# Patient Record
Sex: FEMALE | Race: WHITE | NOT HISPANIC OR LATINO | Employment: OTHER | URBAN - METROPOLITAN AREA
[De-identification: names, ages, dates, MRNs, and addresses within clinical notes are randomized per-mention and may not be internally consistent; named-entity substitution may affect disease eponyms.]

---

## 2020-12-05 ENCOUNTER — TRANSFERRED RECORDS (OUTPATIENT)
Dept: HEALTH INFORMATION MANAGEMENT | Facility: CLINIC | Age: 75
End: 2020-12-05

## 2020-12-05 LAB
ALT SERPL-CCNC: 14 U/L (ref 14–54)
AST SERPL-CCNC: 20 U/L (ref 15–41)
CREATININE (EXTERNAL): 1.61 MG/DL (ref 0.44–1)
GFR ESTIMATED (EXTERNAL): 33.1 ML/MIN/1.73M^2
GFR ESTIMATED (IF AFRICAN AMERICAN) (EXTERNAL): 40.05 ML/MIN/1.73M^2
GLUCOSE (EXTERNAL): 245 MG/DL (ref 65–100)
INR (EXTERNAL): 0.9 (ref 0.9–1.1)
POTASSIUM (EXTERNAL): 5.1 MMOL/L (ref 3.5–5.1)

## 2020-12-06 LAB
CREATININE (EXTERNAL): 1.13 MG/DL (ref 0.44–1)
GFR ESTIMATED (EXTERNAL): 49.8 ML/MIN/1.73M^2
GFR ESTIMATED (IF AFRICAN AMERICAN) (EXTERNAL): >60 ML/MIN/1.73M^2
GLUCOSE (EXTERNAL): 101 MG/DL (ref 65–100)
POTASSIUM (EXTERNAL): 4.4 MMOL/L (ref 3.5–5.1)
TSH SERPL-ACNC: 1.03 MLU/L (ref 0.45–5.33)

## 2020-12-07 LAB
CREATININE (EXTERNAL): 0.84 MG/DL (ref 0.44–1)
GFR ESTIMATED (EXTERNAL): >60 ML/MIN/1.73M^2
GFR ESTIMATED (IF AFRICAN AMERICAN) (EXTERNAL): >60 ML/MIN/1.73M^2
GLUCOSE (EXTERNAL): 106 MG/DL (ref 65–100)
POTASSIUM (EXTERNAL): 4.8 MMOL/L (ref 3.5–5.1)

## 2020-12-08 LAB
CREATININE (EXTERNAL): 0.69 MG/DL (ref 0.44–1)
GFR ESTIMATED (EXTERNAL): >60 ML/MIN/1.73M^2
GFR ESTIMATED (IF AFRICAN AMERICAN) (EXTERNAL): >60 ML/MIN/1.73M^2
GLUCOSE (EXTERNAL): 93 MG/DL (ref 65–100)
POTASSIUM (EXTERNAL): 4.4 MMOL/L (ref 3.5–5.1)

## 2020-12-09 LAB
CREATININE (EXTERNAL): 0.53 MG/DL (ref 0.44–1)
GFR ESTIMATED (EXTERNAL): >60 ML/MIN/1.73M^2
GFR ESTIMATED (IF AFRICAN AMERICAN) (EXTERNAL): >60 ML/MIN/1.73M^2
GLUCOSE (EXTERNAL): 95 MG/DL (ref 65–100)
POTASSIUM (EXTERNAL): 4.1 MMOL/L (ref 3.5–5.1)

## 2021-10-31 ENCOUNTER — ANESTHESIA EVENT (OUTPATIENT)
Dept: GASTROENTEROLOGY | Facility: CLINIC | Age: 76
End: 2021-10-31
Payer: COMMERCIAL

## 2021-10-31 ENCOUNTER — ANESTHESIA (OUTPATIENT)
Dept: GASTROENTEROLOGY | Facility: CLINIC | Age: 76
End: 2021-10-31
Payer: COMMERCIAL

## 2021-10-31 ENCOUNTER — HOSPITAL ENCOUNTER (OUTPATIENT)
Facility: CLINIC | Age: 76
Setting detail: OBSERVATION
Discharge: HOME | End: 2021-11-01
Attending: EMERGENCY MEDICINE | Admitting: SURGERY
Payer: COMMERCIAL

## 2021-10-31 DIAGNOSIS — K92.2 ACUTE UPPER GI BLEEDING: ICD-10-CM

## 2021-10-31 DIAGNOSIS — K92.2 UPPER GI BLEED: Primary | ICD-10-CM

## 2021-10-31 PROBLEM — I10 BENIGN ESSENTIAL HYPERTENSION: Status: ACTIVE | Noted: 2021-10-31

## 2021-10-31 PROBLEM — D62 ANEMIA DUE TO BLOOD LOSS, ACUTE: Status: ACTIVE | Noted: 2021-10-31

## 2021-10-31 PROBLEM — F10.10 EXCESSIVE DRINKING OF ALCOHOL: Status: ACTIVE | Noted: 2021-10-31

## 2021-10-31 PROBLEM — E03.9 ACQUIRED HYPOTHYROIDISM: Status: ACTIVE | Noted: 2021-10-31

## 2021-10-31 PROBLEM — K26.4 GASTROINTESTINAL HEMORRHAGE ASSOCIATED WITH DUODENAL ULCER: Status: ACTIVE | Noted: 2021-10-31

## 2021-10-31 LAB
ABO/RH(D): NORMAL
ALBUMIN SERPL-MCNC: 3 G/DL (ref 3.4–5)
ALBUMIN UR-MCNC: NEGATIVE MG/DL
ALP SERPL-CCNC: 93 U/L (ref 40–150)
ALT SERPL W P-5'-P-CCNC: 16 U/L (ref 0–50)
ANION GAP SERPL CALCULATED.3IONS-SCNC: 10 MMOL/L (ref 3–14)
ANTIBODY SCREEN: NEGATIVE
APPEARANCE UR: ABNORMAL
APTT PPP: 30 SECONDS (ref 22–38)
AST SERPL W P-5'-P-CCNC: 13 U/L (ref 0–45)
BACTERIA #/AREA URNS HPF: ABNORMAL /HPF
BASOPHILS # BLD AUTO: 0 10E3/UL (ref 0–0.2)
BASOPHILS # BLD AUTO: 0.1 10E3/UL (ref 0–0.2)
BASOPHILS NFR BLD AUTO: 1 %
BASOPHILS NFR BLD AUTO: 1 %
BILIRUB SERPL-MCNC: 0.4 MG/DL (ref 0.2–1.3)
BILIRUB UR QL STRIP: NEGATIVE
BUN SERPL-MCNC: 48 MG/DL (ref 7–30)
CALCIUM SERPL-MCNC: 9 MG/DL (ref 8.5–10.1)
CHLORIDE BLD-SCNC: 104 MMOL/L (ref 94–109)
CO2 SERPL-SCNC: 23 MMOL/L (ref 20–32)
COLOR UR AUTO: YELLOW
CREAT SERPL-MCNC: 1.25 MG/DL (ref 0.52–1.04)
EOSINOPHIL # BLD AUTO: 0 10E3/UL (ref 0–0.7)
EOSINOPHIL # BLD AUTO: 0.1 10E3/UL (ref 0–0.7)
EOSINOPHIL NFR BLD AUTO: 1 %
EOSINOPHIL NFR BLD AUTO: 1 %
ERYTHROCYTE [DISTWIDTH] IN BLOOD BY AUTOMATED COUNT: 12.1 % (ref 10–15)
ERYTHROCYTE [DISTWIDTH] IN BLOOD BY AUTOMATED COUNT: 12.2 % (ref 10–15)
FERRITIN SERPL-MCNC: 278 NG/ML (ref 8–252)
GFR SERPL CREATININE-BSD FRML MDRD: 42 ML/MIN/1.73M2
GLUCOSE BLD-MCNC: 119 MG/DL (ref 70–99)
GLUCOSE UR STRIP-MCNC: NEGATIVE MG/DL
HCT VFR BLD AUTO: 28.6 % (ref 35–47)
HCT VFR BLD AUTO: 28.7 % (ref 35–47)
HEMOCCULT STL QL: POSITIVE
HGB BLD-MCNC: 9.6 G/DL (ref 11.7–15.7)
HGB BLD-MCNC: 9.7 G/DL (ref 11.7–15.7)
HGB UR QL STRIP: NEGATIVE
HOLD SPECIMEN: NORMAL
HYALINE CASTS: 4 /LPF
IMM GRANULOCYTES # BLD: 0 10E3/UL
IMM GRANULOCYTES # BLD: 0 10E3/UL
IMM GRANULOCYTES NFR BLD: 1 %
IMM GRANULOCYTES NFR BLD: 1 %
INR PPP: 0.97 (ref 0.85–1.15)
IRON SATN MFR SERPL: 33 % (ref 15–46)
IRON SERPL-MCNC: 94 UG/DL (ref 35–180)
KETONES UR STRIP-MCNC: 5 MG/DL
LEUKOCYTE ESTERASE UR QL STRIP: ABNORMAL
LIPASE SERPL-CCNC: 59 U/L (ref 73–393)
LYMPHOCYTES # BLD AUTO: 2 10E3/UL (ref 0.8–5.3)
LYMPHOCYTES # BLD AUTO: 2 10E3/UL (ref 0.8–5.3)
LYMPHOCYTES NFR BLD AUTO: 34 %
LYMPHOCYTES NFR BLD AUTO: 34 %
MCH RBC QN AUTO: 35 PG (ref 26.5–33)
MCH RBC QN AUTO: 35.3 PG (ref 26.5–33)
MCHC RBC AUTO-ENTMCNC: 33.6 G/DL (ref 31.5–36.5)
MCHC RBC AUTO-ENTMCNC: 33.8 G/DL (ref 31.5–36.5)
MCV RBC AUTO: 104 FL (ref 78–100)
MCV RBC AUTO: 105 FL (ref 78–100)
MONOCYTES # BLD AUTO: 0.6 10E3/UL (ref 0–1.3)
MONOCYTES # BLD AUTO: 0.6 10E3/UL (ref 0–1.3)
MONOCYTES NFR BLD AUTO: 10 %
MONOCYTES NFR BLD AUTO: 11 %
MUCOUS THREADS #/AREA URNS LPF: PRESENT /LPF
NEUTROPHILS # BLD AUTO: 3.1 10E3/UL (ref 1.6–8.3)
NEUTROPHILS # BLD AUTO: 3.2 10E3/UL (ref 1.6–8.3)
NEUTROPHILS NFR BLD AUTO: 52 %
NEUTROPHILS NFR BLD AUTO: 53 %
NITRATE UR QL: NEGATIVE
NRBC # BLD AUTO: 0 10E3/UL
NRBC # BLD AUTO: 0 10E3/UL
NRBC BLD AUTO-RTO: 0 /100
NRBC BLD AUTO-RTO: 0 /100
PH UR STRIP: 5 [PH] (ref 5–7)
PLATELET # BLD AUTO: 264 10E3/UL (ref 150–450)
PLATELET # BLD AUTO: 291 10E3/UL (ref 150–450)
POTASSIUM BLD-SCNC: 4.6 MMOL/L (ref 3.4–5.3)
PROT SERPL-MCNC: 6 G/DL (ref 6.8–8.8)
RBC # BLD AUTO: 2.72 10E6/UL (ref 3.8–5.2)
RBC # BLD AUTO: 2.77 10E6/UL (ref 3.8–5.2)
RBC URINE: 2 /HPF
SARS-COV-2 RNA RESP QL NAA+PROBE: NEGATIVE
SODIUM SERPL-SCNC: 137 MMOL/L (ref 133–144)
SP GR UR STRIP: 1.02 (ref 1–1.03)
SPECIMEN EXPIRATION DATE: NORMAL
SQUAMOUS EPITHELIAL: 8 /HPF
TIBC SERPL-MCNC: 282 UG/DL (ref 240–430)
UPPER GI ENDOSCOPY: NORMAL
UROBILINOGEN UR STRIP-MCNC: 4 MG/DL
WBC # BLD AUTO: 5.9 10E3/UL (ref 4–11)
WBC # BLD AUTO: 5.9 10E3/UL (ref 4–11)
WBC URINE: 18 /HPF

## 2021-10-31 PROCEDURE — 96361 HYDRATE IV INFUSION ADD-ON: CPT | Mod: XU

## 2021-10-31 PROCEDURE — C9803 HOPD COVID-19 SPEC COLLECT: HCPCS

## 2021-10-31 PROCEDURE — 82272 OCCULT BLD FECES 1-3 TESTS: CPT | Performed by: EMERGENCY MEDICINE

## 2021-10-31 PROCEDURE — 250N000011 HC RX IP 250 OP 636: Performed by: SURGERY

## 2021-10-31 PROCEDURE — 87086 URINE CULTURE/COLONY COUNT: CPT | Performed by: EMERGENCY MEDICINE

## 2021-10-31 PROCEDURE — C9113 INJ PANTOPRAZOLE SODIUM, VIA: HCPCS | Performed by: EMERGENCY MEDICINE

## 2021-10-31 PROCEDURE — 83690 ASSAY OF LIPASE: CPT | Performed by: EMERGENCY MEDICINE

## 2021-10-31 PROCEDURE — 85610 PROTHROMBIN TIME: CPT | Performed by: EMERGENCY MEDICINE

## 2021-10-31 PROCEDURE — 258N000003 HC RX IP 258 OP 636: Performed by: FAMILY MEDICINE

## 2021-10-31 PROCEDURE — 96361 HYDRATE IV INFUSION ADD-ON: CPT

## 2021-10-31 PROCEDURE — 99285 EMERGENCY DEPT VISIT HI MDM: CPT | Mod: 25

## 2021-10-31 PROCEDURE — G0378 HOSPITAL OBSERVATION PER HR: HCPCS

## 2021-10-31 PROCEDURE — 86901 BLOOD TYPING SEROLOGIC RH(D): CPT | Performed by: EMERGENCY MEDICINE

## 2021-10-31 PROCEDURE — 250N000009 HC RX 250: Performed by: SURGERY

## 2021-10-31 PROCEDURE — 81001 URINALYSIS AUTO W/SCOPE: CPT | Performed by: EMERGENCY MEDICINE

## 2021-10-31 PROCEDURE — 43239 EGD BIOPSY SINGLE/MULTIPLE: CPT | Performed by: SURGERY

## 2021-10-31 PROCEDURE — 88305 TISSUE EXAM BY PATHOLOGIST: CPT | Mod: TC | Performed by: SURGERY

## 2021-10-31 PROCEDURE — 99285 EMERGENCY DEPT VISIT HI MDM: CPT | Performed by: EMERGENCY MEDICINE

## 2021-10-31 PROCEDURE — 96375 TX/PRO/DX INJ NEW DRUG ADDON: CPT

## 2021-10-31 PROCEDURE — 258N000003 HC RX IP 258 OP 636: Performed by: EMERGENCY MEDICINE

## 2021-10-31 PROCEDURE — 99220 PR INITIAL OBSERVATION CARE,LEVEL III: CPT | Performed by: FAMILY MEDICINE

## 2021-10-31 PROCEDURE — 36415 COLL VENOUS BLD VENIPUNCTURE: CPT | Performed by: EMERGENCY MEDICINE

## 2021-10-31 PROCEDURE — 250N000013 HC RX MED GY IP 250 OP 250 PS 637: Performed by: FAMILY MEDICINE

## 2021-10-31 PROCEDURE — 250N000009 HC RX 250: Performed by: NURSE ANESTHETIST, CERTIFIED REGISTERED

## 2021-10-31 PROCEDURE — 250N000011 HC RX IP 250 OP 636: Performed by: EMERGENCY MEDICINE

## 2021-10-31 PROCEDURE — 96374 THER/PROPH/DIAG INJ IV PUSH: CPT

## 2021-10-31 PROCEDURE — 43255 EGD CONTROL BLEEDING ANY: CPT | Performed by: SURGERY

## 2021-10-31 PROCEDURE — 250N000011 HC RX IP 250 OP 636: Performed by: NURSE ANESTHETIST, CERTIFIED REGISTERED

## 2021-10-31 PROCEDURE — 82728 ASSAY OF FERRITIN: CPT | Performed by: FAMILY MEDICINE

## 2021-10-31 PROCEDURE — 80053 COMPREHEN METABOLIC PANEL: CPT | Performed by: EMERGENCY MEDICINE

## 2021-10-31 PROCEDURE — 85730 THROMBOPLASTIN TIME PARTIAL: CPT | Performed by: EMERGENCY MEDICINE

## 2021-10-31 PROCEDURE — 87635 SARS-COV-2 COVID-19 AMP PRB: CPT | Performed by: EMERGENCY MEDICINE

## 2021-10-31 PROCEDURE — 83550 IRON BINDING TEST: CPT | Performed by: FAMILY MEDICINE

## 2021-10-31 PROCEDURE — 85025 COMPLETE CBC W/AUTO DIFF WBC: CPT | Performed by: EMERGENCY MEDICINE

## 2021-10-31 PROCEDURE — 370N000017 HC ANESTHESIA TECHNICAL FEE, PER MIN: Performed by: SURGERY

## 2021-10-31 PROCEDURE — 250N000013 HC RX MED GY IP 250 OP 250 PS 637: Performed by: SURGERY

## 2021-10-31 PROCEDURE — 99204 OFFICE O/P NEW MOD 45 MIN: CPT | Mod: 25 | Performed by: SURGERY

## 2021-10-31 RX ORDER — ATORVASTATIN CALCIUM 10 MG/1
10 TABLET, FILM COATED ORAL EVERY OTHER DAY
Status: DISCONTINUED | OUTPATIENT
Start: 2021-10-31 | End: 2021-10-31

## 2021-10-31 RX ORDER — ONDANSETRON 2 MG/ML
4 INJECTION INTRAMUSCULAR; INTRAVENOUS EVERY 30 MIN PRN
Status: DISCONTINUED | OUTPATIENT
Start: 2021-10-31 | End: 2021-10-31

## 2021-10-31 RX ORDER — NALOXONE HYDROCHLORIDE 0.4 MG/ML
0.2 INJECTION, SOLUTION INTRAMUSCULAR; INTRAVENOUS; SUBCUTANEOUS
Status: DISCONTINUED | OUTPATIENT
Start: 2021-10-31 | End: 2021-10-31

## 2021-10-31 RX ORDER — ONDANSETRON 4 MG/1
4 TABLET, ORALLY DISINTEGRATING ORAL EVERY 6 HOURS PRN
Status: DISCONTINUED | OUTPATIENT
Start: 2021-10-31 | End: 2021-11-01 | Stop reason: HOSPADM

## 2021-10-31 RX ORDER — EPINEPHRINE IN SOD CHLOR,ISO 1 MG/10 ML
SYRINGE (ML) INTRAVENOUS PRN
Status: DISCONTINUED | OUTPATIENT
Start: 2021-10-31 | End: 2021-10-31

## 2021-10-31 RX ORDER — SODIUM CHLORIDE, SODIUM LACTATE, POTASSIUM CHLORIDE, CALCIUM CHLORIDE 600; 310; 30; 20 MG/100ML; MG/100ML; MG/100ML; MG/100ML
INJECTION, SOLUTION INTRAVENOUS CONTINUOUS
Status: DISCONTINUED | OUTPATIENT
Start: 2021-10-31 | End: 2021-10-31

## 2021-10-31 RX ORDER — ACETAMINOPHEN 325 MG/1
650 TABLET ORAL EVERY 4 HOURS PRN
Status: DISCONTINUED | OUTPATIENT
Start: 2021-10-31 | End: 2021-10-31

## 2021-10-31 RX ORDER — SODIUM CHLORIDE 9 MG/ML
INJECTION, SOLUTION INTRAVENOUS CONTINUOUS
Status: DISCONTINUED | OUTPATIENT
Start: 2021-10-31 | End: 2021-11-01

## 2021-10-31 RX ORDER — ATENOLOL 25 MG/1
25 TABLET ORAL DAILY
COMMUNITY
Start: 2021-09-03

## 2021-10-31 RX ORDER — SODIUM CHLORIDE 9 MG/ML
1000 INJECTION, SOLUTION INTRAVENOUS CONTINUOUS
Status: DISCONTINUED | OUTPATIENT
Start: 2021-10-31 | End: 2021-10-31

## 2021-10-31 RX ORDER — LISINOPRIL 40 MG/1
40 TABLET ORAL AT BEDTIME
Status: DISCONTINUED | OUTPATIENT
Start: 2021-10-31 | End: 2021-11-01 | Stop reason: HOSPADM

## 2021-10-31 RX ORDER — ONDANSETRON 2 MG/ML
4 INJECTION INTRAMUSCULAR; INTRAVENOUS EVERY 6 HOURS PRN
Status: DISCONTINUED | OUTPATIENT
Start: 2021-10-31 | End: 2021-10-31

## 2021-10-31 RX ORDER — SUCRALFATE 1 G/1
1 TABLET ORAL
Status: DISCONTINUED | OUTPATIENT
Start: 2021-10-31 | End: 2021-10-31

## 2021-10-31 RX ORDER — PROPOFOL 10 MG/ML
INJECTION, EMULSION INTRAVENOUS PRN
Status: DISCONTINUED | OUTPATIENT
Start: 2021-10-31 | End: 2021-10-31

## 2021-10-31 RX ORDER — HYDROMORPHONE HYDROCHLORIDE 1 MG/ML
0.3 INJECTION, SOLUTION INTRAMUSCULAR; INTRAVENOUS; SUBCUTANEOUS EVERY 4 HOURS PRN
Status: DISCONTINUED | OUTPATIENT
Start: 2021-10-31 | End: 2021-10-31

## 2021-10-31 RX ORDER — LIDOCAINE 40 MG/G
CREAM TOPICAL
Status: DISCONTINUED | OUTPATIENT
Start: 2021-10-31 | End: 2021-10-31

## 2021-10-31 RX ORDER — LEVOTHYROXINE SODIUM 112 UG/1
112 TABLET ORAL EVERY MORNING
Status: DISCONTINUED | OUTPATIENT
Start: 2021-11-01 | End: 2021-11-01 | Stop reason: HOSPADM

## 2021-10-31 RX ORDER — AMLODIPINE BESYLATE 10 MG/1
10 TABLET ORAL AT BEDTIME
Status: DISCONTINUED | OUTPATIENT
Start: 2021-10-31 | End: 2021-11-01 | Stop reason: HOSPADM

## 2021-10-31 RX ORDER — ATORVASTATIN CALCIUM 10 MG/1
10 TABLET, FILM COATED ORAL EVERY OTHER DAY
Status: DISCONTINUED | OUTPATIENT
Start: 2021-11-01 | End: 2021-11-01 | Stop reason: HOSPADM

## 2021-10-31 RX ORDER — ATORVASTATIN CALCIUM 10 MG/1
1 TABLET, FILM COATED ORAL EVERY OTHER DAY
COMMUNITY
Start: 2021-03-05

## 2021-10-31 RX ORDER — NALOXONE HYDROCHLORIDE 0.4 MG/ML
0.4 INJECTION, SOLUTION INTRAMUSCULAR; INTRAVENOUS; SUBCUTANEOUS
Status: DISCONTINUED | OUTPATIENT
Start: 2021-10-31 | End: 2021-10-31

## 2021-10-31 RX ORDER — SODIUM CHLORIDE 9 MG/ML
INJECTION, SOLUTION INTRAVENOUS CONTINUOUS
Status: DISCONTINUED | OUTPATIENT
Start: 2021-10-31 | End: 2021-10-31

## 2021-10-31 RX ORDER — ANTIOX #8/OM3/DHA/EPA/LUT/ZEAX 250-2.5 MG
1 CAPSULE ORAL 2 TIMES DAILY
COMMUNITY

## 2021-10-31 RX ORDER — SUCRALFATE 1 G/1
1 TABLET ORAL
Status: DISCONTINUED | OUTPATIENT
Start: 2021-11-01 | End: 2021-11-01 | Stop reason: HOSPADM

## 2021-10-31 RX ORDER — ATENOLOL 25 MG/1
25 TABLET ORAL AT BEDTIME
Status: DISCONTINUED | OUTPATIENT
Start: 2021-10-31 | End: 2021-11-01 | Stop reason: HOSPADM

## 2021-10-31 RX ORDER — ONDANSETRON 4 MG/1
4 TABLET, ORALLY DISINTEGRATING ORAL EVERY 6 HOURS PRN
Status: DISCONTINUED | OUTPATIENT
Start: 2021-10-31 | End: 2021-10-31

## 2021-10-31 RX ORDER — LISINOPRIL 40 MG/1
40 TABLET ORAL EVERY EVENING
COMMUNITY
Start: 2021-09-18

## 2021-10-31 RX ORDER — MULTIVIT-MIN/FOLIC ACID/BIOTIN 200-300MCG
2 TABLET,CHEWABLE ORAL DAILY
COMMUNITY

## 2021-10-31 RX ORDER — AMLODIPINE BESYLATE 10 MG/1
10 TABLET ORAL EVERY EVENING
COMMUNITY
Start: 2021-09-18

## 2021-10-31 RX ORDER — ONDANSETRON 2 MG/ML
4 INJECTION INTRAMUSCULAR; INTRAVENOUS EVERY 6 HOURS PRN
Status: DISCONTINUED | OUTPATIENT
Start: 2021-10-31 | End: 2021-11-01 | Stop reason: HOSPADM

## 2021-10-31 RX ORDER — PANTOPRAZOLE SODIUM 20 MG/1
20 TABLET, DELAYED RELEASE ORAL
Status: DISCONTINUED | OUTPATIENT
Start: 2021-11-01 | End: 2021-11-01 | Stop reason: HOSPADM

## 2021-10-31 RX ORDER — LEVOTHYROXINE SODIUM 112 UG/1
112 TABLET ORAL DAILY
COMMUNITY

## 2021-10-31 RX ADMIN — SODIUM CHLORIDE 500 ML: 9 INJECTION, SOLUTION INTRAVENOUS at 09:34

## 2021-10-31 RX ADMIN — SUCRALFATE 1 G: 1 TABLET ORAL at 14:19

## 2021-10-31 RX ADMIN — PANTOPRAZOLE SODIUM 40 MG: 40 INJECTION, POWDER, FOR SOLUTION INTRAVENOUS at 09:39

## 2021-10-31 RX ADMIN — AMLODIPINE BESYLATE 10 MG: 10 TABLET ORAL at 21:45

## 2021-10-31 RX ADMIN — PROPOFOL 80 MG: 10 INJECTION, EMULSION INTRAVENOUS at 11:55

## 2021-10-31 RX ADMIN — SUCRALFATE 1 G: 1 TABLET ORAL at 17:11

## 2021-10-31 RX ADMIN — LISINOPRIL 40 MG: 40 TABLET ORAL at 21:45

## 2021-10-31 RX ADMIN — SODIUM CHLORIDE: 9 INJECTION, SOLUTION INTRAVENOUS at 19:07

## 2021-10-31 RX ADMIN — SODIUM CHLORIDE: 9 INJECTION, SOLUTION INTRAVENOUS at 14:19

## 2021-10-31 RX ADMIN — LIDOCAINE HYDROCHLORIDE 50 MG: 10 INJECTION, SOLUTION EPIDURAL; INFILTRATION; INTRACAUDAL; PERINEURAL at 11:55

## 2021-10-31 RX ADMIN — ONDANSETRON 4 MG: 2 INJECTION INTRAMUSCULAR; INTRAVENOUS at 09:35

## 2021-10-31 RX ADMIN — TOPICAL ANESTHETIC 1 SPRAY: 200 SPRAY DENTAL; PERIODONTAL at 11:53

## 2021-10-31 RX ADMIN — SODIUM CHLORIDE 1000 ML: 9 INJECTION, SOLUTION INTRAVENOUS at 10:57

## 2021-10-31 RX ADMIN — ATENOLOL 25 MG: 25 TABLET ORAL at 21:45

## 2021-10-31 ASSESSMENT — MIFFLIN-ST. JEOR: SCORE: 939.94

## 2021-10-31 NOTE — ED PROVIDER NOTES
History     Chief Complaint   Patient presents with     Hematemesis     onset last night at 11:30.     Rectal Bleeding     several times of bright red blood per rectum     HPI  Alix Keith is a 76 year old female with a past medical history significant for hypertension, hypothyroidism and previous upper GI bleed who presents emergency department complaining of dark red blood per rectum and hematemesis.  Patient states that about 1130 last night she threw up and had dark blood present.  She also started having blood per rectum which has been dark and loose.  There has been blood in them.  She has some epigastric upset/heartburn earlier in the day but otherwise does not have any abdominal pain she denies any fever chills lightheadedness she has not had any chest pain or shortness of breath.  She denies any back pain.  She has not had any focal numbness weakness in any extremity.  She denies any bowel or bladder dysfunction.  She has had about 4 or 5 episodes of dark blood per rectum.  She states last time she had this she was in [place] and they scoped both upper and lower regions and found a ulcer in epigastrium which was ablated per patient.    Allergies:  Allergies   Allergen Reactions     Sulfa Drugs        Problem List:    There are no problems to display for this patient.       Past Medical History:    No past medical history on file.    Past Surgical History:    No past surgical history on file.    Family History:    No family history on file.    Social History:  Marital Status:  Single [1]  Social History     Tobacco Use     Smoking status: Not on file   Substance Use Topics     Alcohol use: Not on file     Drug use: Not on file        Medications:    No current outpatient medications on file.        Review of Systems  All systems reviewed and other than pertinent positives and negatives in HPI all other systems are negative.  Physical Exam   BP: 123/79  Pulse: 92  Temp: 97.8  F (36.6  C)  Resp:  "20  Height: 154.9 cm (5' 1\")  Weight: 51.3 kg (113 lb)  SpO2: 98 %      Physical Exam  Constitutional:       General: She is not in acute distress.     Appearance: Normal appearance. She is ill-appearing. She is not toxic-appearing.   HENT:      Head: Normocephalic and atraumatic.      Nose: Nose normal.   Eyes:      Conjunctiva/sclera: Conjunctivae normal.   Cardiovascular:      Rate and Rhythm: Normal rate and regular rhythm.      Pulses: Normal pulses.      Heart sounds: Normal heart sounds. No murmur heard.     Pulmonary:      Effort: Pulmonary effort is normal.      Breath sounds: Normal breath sounds. No wheezing or rhonchi.   Abdominal:      General: Abdomen is flat. Bowel sounds are normal. There is no distension.      Palpations: Abdomen is soft.      Tenderness: There is no abdominal tenderness. There is no right CVA tenderness or left CVA tenderness.   Genitourinary:     Comments: Rectal: Normal tone no masses or lesions.  Dark melanotic stool with blood present.  Musculoskeletal:         General: No swelling or tenderness. Normal range of motion.      Cervical back: Normal range of motion and neck supple.      Right lower leg: No edema.      Left lower leg: No edema.   Skin:     General: Skin is warm and dry.      Findings: No rash.   Neurological:      General: No focal deficit present.      Mental Status: She is alert and oriented to person, place, and time.      Motor: No weakness.      Coordination: Coordination normal.   Psychiatric:         Mood and Affect: Mood normal.         ED Course        Procedures              Critical Care time:  none               Results for orders placed or performed during the hospital encounter of 10/31/21 (from the past 24 hour(s))   [place] Draw    Narrative    The following orders were created for panel order [place] Draw.  Procedure                               Abnormality         Status                     ---------                               -----------      "    ------                     Extra Blue Top Tube[[id number]]                              In process                 Extra Red Top Tube[[id number]]                               In process                 Extra Green Top (Lithium...[[id number]]                      In process                 Extra Purple Top Tube[[id number]]                            In process                 Extra Purple Top Tube[[id number]]                            In process                   Please view results for these tests on the individual orders.   Comprehensive metabolic panel   Result Value Ref Range    Sodium 137 133 - 144 mmol/L    Potassium 4.6 3.4 - 5.3 mmol/L    Chloride 104 94 - 109 mmol/L    Carbon Dioxide (CO2)      Anion Gap      Urea Nitrogen      Creatinine      Calcium      Glucose      Alkaline Phosphatase      AST      ALT      Protein Total      Albumin      Bilirubin Total      GFR Estimate     CBC with platelets, differential    Narrative    The following orders were created for panel order CBC with platelets, differential.  Procedure                               Abnormality         Status                     ---------                               -----------         ------                     CBC with platelets and d...[[id number]]  Abnormal            Final result                 Please view results for these tests on the individual orders.   CBC with platelets and differential   Result Value Ref Range    WBC Count 5.9 4.0 - 11.0 10e3/uL    RBC Count 2.77 (L) 3.80 - 5.20 10e6/uL    Hemoglobin 9.7 (L) 11.7 - 15.7 g/dL    Hematocrit 28.7 (L) 35.0 - 47.0 %     (H) 78 - 100 fL    MCH 35.0 (H) 26.5 - 33.0 pg    MCHC 33.8 31.5 - 36.5 g/dL    RDW 12.1 10.0 - 15.0 %    Platelet Count 264 150 - 450 10e3/uL    % Neutrophils 52 %    % Lymphocytes 34 %    % Monocytes 11 %    % Eosinophils 1 %    % Basophils 1 %    % Immature Granulocytes 1 %    NRBCs per 100 WBC 0 <1 /100    Absolute Neutrophils 3.1 1.6 - 8.3 10e3/uL     Absolute Lymphocytes 2.0 0.8 - 5.3 10e3/uL    Absolute Monocytes 0.6 0.0 - 1.3 10e3/uL    Absolute Eosinophils 0.1 0.0 - 0.7 10e3/uL    Absolute Basophils 0.1 0.0 - 0.2 10e3/uL    Absolute Immature Granulocytes 0.0 <=0.0 10e3/uL    Absolute NRBCs 0.0 10e3/uL       Medications   pantoprazole (PROTONIX) IV push injection 40 mg (has no administration in time range)   ondansetron (ZOFRAN) injection 4 mg (has no administration in time range)   0.9% sodium chloride BOLUS (has no administration in time range)   sodium chloride 0.9% infusion (has no administration in time range)       Assessments & Plan (with Medical Decision Making) records were reviewed.  Labs were obtained.  Patient was given IV Protonix after rectal exam revealed melanotic stool.  Patient's white count was 5.9 hemoglobin 9.7 platelet count 264.  Comprehensive metabolic panel with a creatinine of 1.25 BUN of 48.  Lipase was 59.  UA with 4 urobilinogen 5 ketones moderate leukocyte esterase with 18 WBCs.  There were 8 squamous cells present.  PT PTT within normal limits.  Patient was typed and screened and Covid test was obtained and was negative.  I discussed the case with Dr. Alcaraz with general surgery and he was planning to do an upper endoscopy now.  He evaluate the patient.  Case was discussed with Dr. FELIPA Dillon hospitalist with [facility] and he is in agreement with admission after scoping.  Findings were discussed with patient and she is in agreement with admission after scoping.  She has not eaten since yesterday.     I have reviewed the nursing notes.    I have reviewed the findings, diagnosis, plan and need for follow up with the patient.       New Prescriptions    No medications on file       Final diagnoses:   Acute upper GI bleeding - proabable       10/31/2021   [facility] EMERGENCY DEPT     Anali, Sam Ceballos MD  11/02/21 [id number]

## 2021-10-31 NOTE — ANESTHESIA POSTPROCEDURE EVALUATION
Patient: Alix Keith    Procedure: Procedure(s):  ESOPHAGOGASTRODUODENOSCOPY, WITH HEMORRHAGE CONTROL  Esophagogastroduodenoscopy, With Biopsy       Diagnosis:Upper GI bleed [K92.2]  Diagnosis Additional Information: No value filed.    Anesthesia Type:  MAC    Note:  Disposition: Inpatient   Postop Pain Control: Uneventful            Sign Out: Well controlled pain   PONV: No   Neuro/Psych: Uneventful            Sign Out: Acceptable/Baseline neuro status   Airway/Respiratory: Uneventful            Sign Out: Acceptable/Baseline resp. status   CV/Hemodynamics: Uneventful            Sign Out: Acceptable CV status; No obvious hypovolemia; No obvious fluid overload   Other NRE: NONE   DID A NON-ROUTINE EVENT OCCUR? No           Last vitals:  Vitals Value Taken Time   /68 10/31/21 1216   Temp 36.9  C (98.5  F) 10/31/21 1216   Pulse 63 10/31/21 1216   Resp 16 10/31/21 1216   SpO2 100 % 10/31/21 1216       Electronically Signed By: Teo Kennedy CRNA, APRN CRNA  October 31, 2021  12:22 PM

## 2021-10-31 NOTE — ANESTHESIA CARE TRANSFER NOTE
Patient: Alix Keith    Procedure: Procedure(s):  ESOPHAGOGASTRODUODENOSCOPY, WITH HEMORRHAGE CONTROL  Esophagogastroduodenoscopy, With Biopsy       Diagnosis: Upper GI bleed [K92.2]  Diagnosis Additional Information: No value filed.    Anesthesia Type:   MAC     Note:    Oropharynx: oropharynx clear of all foreign objects and spontaneously breathing  Level of Consciousness: awake  Oxygen Supplementation: room air    Independent Airway: airway patency satisfactory and stable  Dentition: dentition unchanged  Vital Signs Stable: post-procedure vital signs reviewed and stable  Report to RN Given: handoff report given  Patient transferred to: Phase II    Handoff Report: Identifed the Patient, Identified the Reponsible Provider, Reviewed the pertinent medical history, Discussed the surgical course, Reviewed Intra-OP anesthesia mangement and issues during anesthesia, Set expectations for post-procedure period and Allowed opportunity for questions and acknowledgement of understanding      Vitals:  Vitals Value Taken Time   BP     Temp     Pulse     Resp     SpO2         Electronically Signed By: Teo Kennedy CRNA, APRN CRNA  October 31, 2021  12:17 PM

## 2021-10-31 NOTE — H&P
[facility]    History and Physical - Hospitalist Service       Date of Admission:  10/31/2021    Assessment & Plan      Alix Keith is a 76 year old female admitted on 10/31/2021 for an acute GI bleed found to be coming from 2 duodenal ulcers.    Principal Problem:    Gastrointestinal hemorrhage associated with duodenal ulcer  Active Problems:    Anemia due to blood loss, acute    Benign essential hypertension    Acquired hypothyroidism    Excessive drinking of alcohol without diagnosis of alcohol use disorder    Gastrointestinal hemorrhage associated with duodenal ulcer  Anemia due to blood loss, acute on probably chronic - Hgb 9.6 10/31  Reportedly had a similar episode in [place] early Jan 2021, without clear follow-up. Records can't be accessed easily and patient and her friend are unclear on details. EGD done 10/31/2021 by GenSurg showing bleeding duodenal ulcers. Clipped and H. pylori biopsies pending. Likely acute on chronic because patient had been feeling low energy and reduced appetite ever since January, then with acute episode on day of admission. Appears to have been given pantoprazole and quit after 1 month  -S/p 40mg IV pantoprazole in ER  -Carafate tablet - order changed to TID for less interference with other meds administered  -PO pantoprazole 20mg BIDAC   -Recheck Hgb in AM  -Add-on ferritin    Excessive drinking of alcohol without diagnosis of alcoholism  Average 14 drinks/week, corroborated by family member. No hx of withdrawal nor liver disease. Noted in case of relevance to bleeding. Normal transaminases and no stigmata of liver disease.  -Will not start CIWA. Monitor for withdrawal symptoms but likely lower risk    Benign essential hypertension  -PTA amlodipine, atenolol, lisinopril qhs    Acquired hypothyroidism  -PTA Synthroid qAM at least 30 minutes before any other meds or food    Covid-19 Status  Covid negative by PCR on presentation 10/31/2021       Diet:  "Clear Liquid Diet for today, advance as tolerated 11/1.  DVT Prophylaxis: VTE Prophylaxis contraindicated due to active GI bleed  [place] Catheter: Not present  Central Lines: None  Code Status:  FULL per discussion with patient and friend. They say that they have ACP documents in case of permanent vegetative state, would not want life support measures.    Disposition Plan   Expected discharge: 11/1/2021 recommended to prior living arrangement once hemoglobin stable. Due to patient residing in [place], added comments to patient discharge summary to help with transition to PCP follow-up.     The patient's care was discussed with the Bedside Nurse, Patient and Patient's Family.    Rosalee Kessler MD  [facility]  Securely message with the [employer] Web Console (learn more here)  Text page via [facility] Paging/Directory  _______________________________________________________    Chief Complaint   Hematemesis and Melena    History of Present Illness   Alix Keith is a 76 year old woman visiting from [place] with a history of high blood pressure and hypothyroid, otherwise quite healthy, who on 10/31 experienced nausea w/hematemesis and melena.  There was no syncope, chest pain. She mentions a fall in Dec 2020 that she thought may have caused a prior bleeding ulcer and upon clarification, it sounds that she had a mechanical fall related to tripping on her own shoes in the garage, and landed on her knees and hands. There was no associated dizziness or fainting. The bleeding ulcer was diagnosed and \"ablated\" via endoscopy in early January, but they are unclear of details and do not believe they were told to follow up about it. Pt did not report taking pantoprazole at the time of this interview. Since that time, she has been having loss of interest in food including loss of taste and smell that she had tested and was found to be Covid negative. She has otherwise been in her usual state of health without " noticing blood in the stool nor vomiting.  She remembers being given a medication to help with the bleed and took it for a month, which appears to be pantoprazole per [place] prescription record.    Review of Systems    The 10 point Review of Systems is negative other than noted in the HPI or here.     Past Medical History    I have reviewed this patient's medical history and updated it with pertinent information if needed.   Past Medical History:   Diagnosis Date     Acquired hypothyroidism      Benign essential hypertension      Past Surgical History   I have reviewed this patient's surgical history and updated it with pertinent information if needed.  No past surgical history on file.    Social History   I have reviewed this patient's social history and updated it with pertinent information if needed.  Social History     Tobacco Use     Smoking status: Never Smoker   Substance Use Topics     Alcohol use: Yes     Alcohol/week: 14.0 standard drinks     Types: 14 Glasses of wine per week     Comment: 2 drinks per day, sometimes one wine, one bourbon and water.     Drug use: Not on file   Drinks daily but reports casual and with dinner usually, sometimes with lunch. Reports up to 5 days at a time without drinking and it's not bothersome. Never has had any withdrawal.    Family History   Both parents passed in late 70s-early 80s but they were healthy otherwise. She is not sure how they  because her dad  in his sleep, and not sure about her mom    Prior to Admission Medications   Prior to Admission Medications   Prescriptions Last Dose Informant Patient Reported? Taking?   Cyanocobalamin (VITAMIN B-12 PO) 10/30/2021 at am Self Yes Yes   Sig: Take 1 tablet by mouth daily   Multiple Vitamins-Minerals (PRESERVISION AREDS 2) CAPS 10/29/2021 at am Self Yes Yes   Sig: Take 1 capsule by mouth 2 times daily   Multiple Vitamins-Minerals (WOMENS MULTI GUMMIES) CHEW 10/29/2021 at am Self Yes Yes   Sig: Take 2 chew tab by mouth  daily   amLODIPine (NORVASC) 10 MG tablet 10/29/2021 at pm Self Yes Yes   Sig: Take 10 mg by mouth every evening    atenolol (TENORMIN) 25 MG tablet 10/30/2021 at am Self Yes Yes   Sig: Take 25 mg by mouth daily   atorvastatin (LIPITOR) 10 MG tablet 10/29/2021 at am Self Yes Yes   Sig: Take 1 tablet by mouth every other day On odd days   levothyroxine (SYNTHROID/LEVOTHROID) 112 MCG tablet 10/30/2021 at am Self Yes Yes   Sig: Take 112 mcg by mouth daily   lisinopril (ZESTRIL) 40 MG tablet 10/29/2021 at pm Self Yes Yes   Sig: Take 40 mg by mouth every evening       Facility-Administered Medications: None     Allergies   Allergies   Allergen Reactions     Food Other (See Comments)     Doesn't eat beef, onion      Sulfa Drugs Other (See Comments)     Skin bubbled up and burned     Physical Exam   Vital Signs: Temp: 98.5  F (36.9  C) Temp src: Oral BP: (!) 140/64 Pulse: 68   Resp: 18 SpO2: 96 % O2 Device: None (Room air) Oxygen Delivery: 1 LPM  Weight: 113 lbs 0 oz  Constitutional: awake, alert, cooperative, no apparent distress, and appears stated age  Eyes: Lids and lashes normal, pupils equal, round and reactive to light, extra ocular muscles intact, sclera clear, conjunctiva normal  ENT: Normocephalic, without obvious abnormality, atraumatic  Respiratory: No increased work of breathing, good air exchange, clear to auscultation bilaterally, no crackles or wheezing  Cardiovascular: Normal apical impulse, regular rate and rhythm, normal S1 and S2, no S3 or S4, and no murmur noted  GI: No scars, normal bowel sounds, soft, non-distended, non-tender, no masses palpated, no hepatosplenomegally  Skin: no bruising or bleeding, normal skin color, texture, turgor and no rashes  Musculoskeletal: There is no redness, warmth, or swelling of the joints. Tone is normal.  Neuro/psych: Awake, alert, oriented, calm and normal eye contact    Data   Data reviewed today: I reviewed all medications, new labs and imaging results over the  last 24 hours.    Recent Labs   Lab 10/31/21  0906   WBC 5.9  5.9   HGB 9.6*  9.7*   *  104*     264   INR 0.97      POTASSIUM 4.6   CHLORIDE 104   CO2 23   BUN 48*   CR 1.25*   ANIONGAP 10   DONNIE 9.0   *   ALBUMIN 3.0*   PROTTOTAL 6.0*   BILITOTAL 0.4   ALKPHOS 93   ALT 16   AST 13   LIPASE 59*

## 2021-10-31 NOTE — ANESTHESIA PREPROCEDURE EVALUATION
Anesthesia Pre-Procedure Evaluation    Patient: Alix Keith   MRN: [id number] : 1945        Preoperative Diagnosis: * No surgery found *    Procedure :           No past medical history on file.   No past surgical history on file.   Allergies   Allergen Reactions     Food Other (See Comments)     Doesn't eat beef, onion      Sulfa Drugs Other (See Comments)     Skin bubbled up and burned      Social History     Tobacco Use     Smoking status: Not on file   Substance Use Topics     Alcohol use: Not on file      Wt Readings from Last 1 Encounters:   10/31/21 51.3 kg (113 lb)        Anesthesia Evaluation   Pt has had prior anesthetic. Type: General and MAC.    No history of anesthetic complications       ROS/MED HX  ENT/Pulmonary:  - neg pulmonary ROS     Neurologic:  - neg neurologic ROS     Cardiovascular:     (+) hypertension-----    METS/Exercise Tolerance: >4 METS    Hematologic: Comments: Anemic from GI bleed    (+) anemia,     Musculoskeletal:  - neg musculoskeletal ROS     GI/Hepatic: Comment: Hx GI bleed hx duodenal ulcer s/p cauterization and clip at OSH    Current hematemesis/melena    Recent NSAID use    (+) GERD, esophageal disease,     Renal/Genitourinary:       Endo:     (+) thyroid problem, hypothyroidism,     Psychiatric/Substance Use:  - neg psychiatric ROS     Infectious Disease:  - neg infectious disease ROS     Malignancy:  - neg malignancy ROS     Other:  - neg other ROS          Physical Exam    Airway        Mallampati: I   TM distance: > 3 FB   Neck ROM: full   Mouth opening: > 3 cm    Respiratory Devices and Support         Dental  no notable dental history         Cardiovascular   cardiovascular exam normal       Rhythm and rate: regular and normal     Pulmonary   pulmonary exam normal        breath sounds clear to auscultation           OUTSIDE LABS:  CBC:   Lab Results   Component Value Date    WBC 5.9 10/31/2021    HGB 9.7 (L) 10/31/2021    HCT 28.7 (L) 10/31/2021      10/31/2021     BMP:   Lab Results   Component Value Date     10/31/2021    POTASSIUM 4.6 10/31/2021    CHLORIDE 104 10/31/2021    CO2 23 10/31/2021    BUN 48 (H) 10/31/2021    CR 1.25 (H) 10/31/2021     (H) 10/31/2021     COAGS:   Lab Results   Component Value Date    PTT 30 10/31/2021    INR 0.97 10/31/2021     POC: No results found for: BGM, HCG, HCGS  HEPATIC:   Lab Results   Component Value Date    ALBUMIN 3.0 (L) 10/31/2021    PROTTOTAL 6.0 (L) 10/31/2021    ALT 16 10/31/2021    AST 13 10/31/2021    ALKPHOS 93 10/31/2021    BILITOTAL 0.4 10/31/2021     OTHER:   Lab Results   Component Value Date    DONNIE 9.0 10/31/2021    LIPASE 59 (L) 10/31/2021       Anesthesia Plan    ASA Status:  2, emergent    NPO Status:  ELEVATED Aspiration Risk/Unknown    Anesthesia Type: MAC.              Consents    Anesthesia Plan(s) and associated risks, benefits, and realistic alternatives discussed. Questions answered and patient/representative(s) expressed understanding.     - Discussed with:  Patient      - Extended Intubation/Ventilatory Support Discussed: No.      - Patient is DNR/DNI Status: No    Use of blood products discussed: No .     Postoperative Care    Pain management: IV analgesics.   PONV prophylaxis: Ondansetron (or other 5HT-3)     Comments:    Emergency due to anemia and active gi bleed            Teo Kennedy, CRNA, APRN CRNA

## 2021-10-31 NOTE — BRIEF OP NOTE
[facility]    Brief Operative Note    Pre-operative diagnosis: Upper GI bleed [K92.2]  Post-operative diagnosis 1. Small bleeding duodenal ulcer  2. Hiatal Hernia  3. Schatzki ring    Procedure: Procedure(s):  ESOPHAGOGASTRODUODENOSCOPY, WITH HEMORRHAGE CONTROL  Esophagogastroduodenoscopy, With Biopsy  Surgeon: Surgeon(s) and Role:     * Beto Alcaraz DO - Primary  Anesthesia: Choice   Estimated Blood Loss: Minimal    Drains: None  Specimens:   ID Type Source Tests Collected by Time Destination   1 :  Biopsy Stomach, Antrum SURGICAL PATHOLOGY EXAM Beto Alcaraz DO 10/31/2021 12:04 PM      Findings:   small bleeding duodenal ulcer, hemostasis achieved with clips and epinephrine injection.  Complications: None.  Implants: * No implants in log *    Clear liquids for today, Serial hemoglobins

## 2021-10-31 NOTE — ED NOTES
Pt had heartburn, went to the bathroom and noticed rectal bleeding last night around 11:30pm. Pt started feeling nauseous with heartburn and vomited roughly 4x. Blood in emesis each time. Pt denies difficulty breathing or chest pain.

## 2021-10-31 NOTE — CONSULTS
"Surgical Consultation/History and Physical  [facility] General Surgery    Alix is seen in consultation for Hematemesis, at the request of Dr. Briones.    Chief Complaint:  Hematemesis    History of Present Illness: Alix Keith is a 76 year old female presents with hematemesis.  Patient presents with melena (began last night) and hematemesis which began this morning.  She has had this 1.5 years prior in [place], this was determined to be a duodenal ulcer which was controlled with coagulation and clip.  She had both upper and lower endoscopies at that time.  Denies dizziness, pre-syncope, nausea, vomiting, chest pain or shortness of breath.  Recently did take Ibuprofen for osteoarthritis, single dose which did not improve her symptoms.  Denies history of liver disease.  Denies anticoagulant use.      There is no problem list on file for this patient.    No past medical history on file.    No past surgical history on file.    No family history on file.    Social History     Tobacco Use     Smoking status: Not on file   Substance Use Topics     Alcohol use: Not on file        History   Drug Use Not on file       Current Outpatient Medications   Medication Sig Dispense Refill     levothyroxine (SYNTHROID/LEVOTHROID) 112 MCG tablet Take 112 mcg by mouth daily         Allergies   Allergen Reactions     Food Other (See Comments)     Doesn't eat beef, onion      Sulfa Drugs Other (See Comments)     Skin bubbled up and burned       Review of Systems:   10 point ROS otherwise negative    Physical Exam:  /56   Pulse 78   Temp 97.8  F (36.6  C) (Temporal)   Resp 16   Ht 1.549 m (5' 1\")   Wt 51.3 kg (113 lb)   SpO2 99%   BMI 21.35 kg/m      Constitutional- No acute distress, well nourished, non-toxic  Eyes: Anicteric, no injection.  PERRL  ENT:  Normocephalic, atraumatic, Nose midline, moist mucus membranes  Neck - supple, no LAD  Respiratory- Clear to auscultation bilaterally, good inspiratory " effort  Cardiovascular - Heart RRR, no lift's, thrills, murmurs, rubs, or gallop.  No peripheral edema.  No clubbing.  Abdomen - Soft, non-tender, +BS, no hepatosplenomegaly, no palpable masses  Neuro - No focal neuro deficits, Alert and oriented x 3  Psych: Appropriate mood and affect  Musculoskeletal: Normal gait, symmetric strength.  FROM upper and lower extremities.  Skin: Warm, Dry    Lab Results   Component Value Date    WBC 5.9 10/31/2021     Lab Results   Component Value Date    RBC 2.77 10/31/2021     Lab Results   Component Value Date    HGB 9.7 10/31/2021     Lab Results   Component Value Date    HCT 28.7 10/31/2021     Lab Results   Component Value Date     10/31/2021     Lab Results   Component Value Date    MCH 35.0 10/31/2021     Lab Results   Component Value Date    MCHC 33.8 10/31/2021     Lab Results   Component Value Date    RDW 12.1 10/31/2021     Lab Results   Component Value Date     10/31/2021     Last Comprehensive Metabolic Panel:  Sodium   Date Value Ref Range Status   10/31/2021 137 133 - 144 mmol/L Final     Potassium   Date Value Ref Range Status   10/31/2021 4.6 3.4 - 5.3 mmol/L Final     Chloride   Date Value Ref Range Status   10/31/2021 104 94 - 109 mmol/L Final     Carbon Dioxide (CO2)   Date Value Ref Range Status   10/31/2021 23 20 - 32 mmol/L Final     Anion Gap   Date Value Ref Range Status   10/31/2021 10 3 - 14 mmol/L Final     Glucose   Date Value Ref Range Status   10/31/2021 119 (H) 70 - 99 mg/dL Final     Urea Nitrogen   Date Value Ref Range Status   10/31/2021 48 (H) 7 - 30 mg/dL Final     Creatinine   Date Value Ref Range Status   10/31/2021 1.25 (H) 0.52 - 1.04 mg/dL Final     GFR Estimate   Date Value Ref Range Status   10/31/2021 42 (L) >60 mL/min/1.73m2 Final     Comment:     As of July 11, 2021, eGFR is calculated by the CKD-EPI creatinine equation, without race adjustment. eGFR can be influenced by muscle mass, exercise, and diet. The reported eGFR is  an estimation only and is only applicable if the renal function is stable.     Calcium   Date Value Ref Range Status   10/31/2021 9.0 8.5 - 10.1 mg/dL Final     Bilirubin Total   Date Value Ref Range Status   10/31/2021 0.4 0.2 - 1.3 mg/dL Final     Alkaline Phosphatase   Date Value Ref Range Status   10/31/2021 93 40 - 150 U/L Final     ALT   Date Value Ref Range Status   10/31/2021 16 0 - 50 U/L Final     AST   Date Value Ref Range Status   10/31/2021 13 0 - 45 U/L Final     INR   Date Value Ref Range Status   10/31/2021 0.97 0.85 - 1.15 Final      Lipase   Date Value Ref Range Status   10/31/2021 59 (L) 73 - 393 U/L Final       Assessment:  1. Upper GI bleed     Plan:   Mrs. Keith presents with upper GI bleed with melena and hematemesis.  She is hemodynamically stable with hemoglobin of 9.7.  She has received Protonix in ED.  With her history of duodenal ulcer, suspicion for duodenal ulcer is high with recent NSAID use.  I discussed the indications, risks, benefits, alternatives with patient and she wishes to proceed with esophagogastroduodenoscopy with possible control of bleeding.  Risks discussed include but are not limited to: bleeding, perforation, non therapeutic operation, need for additional procedures, cardiopulmonary complications of anesthesia.  She wishes to proceed.  Patient to be admitted for observation overnight.      Beto Alcaraz,  on 10/31/2021 at 11:05 AM

## 2021-11-01 VITALS
RESPIRATION RATE: 16 BRPM | DIASTOLIC BLOOD PRESSURE: 76 MMHG | SYSTOLIC BLOOD PRESSURE: 120 MMHG | HEART RATE: 62 BPM | BODY MASS INDEX: 21.34 KG/M2 | HEIGHT: 61 IN | TEMPERATURE: 98.7 F | WEIGHT: 113 LBS | OXYGEN SATURATION: 97 %

## 2021-11-01 LAB
HGB BLD-MCNC: 7.7 G/DL (ref 11.7–15.7)
HGB BLD-MCNC: 8 G/DL (ref 11.7–15.7)
HOLD SPECIMEN: NORMAL

## 2021-11-01 PROCEDURE — 85018 HEMOGLOBIN: CPT | Performed by: FAMILY MEDICINE

## 2021-11-01 PROCEDURE — 36415 COLL VENOUS BLD VENIPUNCTURE: CPT | Performed by: SURGERY

## 2021-11-01 PROCEDURE — G0378 HOSPITAL OBSERVATION PER HR: HCPCS

## 2021-11-01 PROCEDURE — 96361 HYDRATE IV INFUSION ADD-ON: CPT

## 2021-11-01 PROCEDURE — 99217 PR OBSERVATION CARE DISCHARGE: CPT | Performed by: INTERNAL MEDICINE

## 2021-11-01 PROCEDURE — 258N000003 HC RX IP 258 OP 636: Performed by: FAMILY MEDICINE

## 2021-11-01 PROCEDURE — 99207 PR CDG-CODE CATEGORY CHANGED: CPT | Performed by: INTERNAL MEDICINE

## 2021-11-01 PROCEDURE — 250N000013 HC RX MED GY IP 250 OP 250 PS 637: Performed by: FAMILY MEDICINE

## 2021-11-01 PROCEDURE — 36415 COLL VENOUS BLD VENIPUNCTURE: CPT | Performed by: FAMILY MEDICINE

## 2021-11-01 PROCEDURE — 85018 HEMOGLOBIN: CPT | Performed by: SURGERY

## 2021-11-01 RX ORDER — PANTOPRAZOLE SODIUM 40 MG/1
40 TABLET, DELAYED RELEASE ORAL DAILY
Qty: 30 TABLET | Refills: 0 | Status: SHIPPED | OUTPATIENT
Start: 2021-11-01

## 2021-11-01 RX ORDER — SUCRALFATE 1 G/1
1 TABLET ORAL
Qty: 90 TABLET | Refills: 0 | Status: SHIPPED | OUTPATIENT
Start: 2021-11-01

## 2021-11-01 RX ADMIN — SODIUM CHLORIDE: 9 INJECTION, SOLUTION INTRAVENOUS at 07:01

## 2021-11-01 RX ADMIN — ATORVASTATIN CALCIUM 10 MG: 10 TABLET, FILM COATED ORAL at 07:53

## 2021-11-01 RX ADMIN — SUCRALFATE 1 G: 1 TABLET ORAL at 11:55

## 2021-11-01 RX ADMIN — PANTOPRAZOLE SODIUM 20 MG: 20 TABLET, DELAYED RELEASE ORAL at 07:53

## 2021-11-01 RX ADMIN — LEVOTHYROXINE SODIUM 112 MCG: 0.11 TABLET ORAL at 07:00

## 2021-11-01 RX ADMIN — SUCRALFATE 1 G: 1 TABLET ORAL at 07:53

## 2021-11-01 NOTE — PLAN OF CARE
"Patient is a&o, up independently in her room; steady on her feet. Denying any pain, numbness or tingling in hands or feet. No abdominal discomfort. Tolerated regular breakfast. Plan to re-check hgb at 1200 today. Patient had soft maroon BM (but flushed it prior to showing RN). Clear lung sounds, VS stable, afebrile. /64 (BP Location: Left arm)   Pulse 57   Temp 98.2  F (36.8  C) (Oral)   Resp 18   Ht 1.549 m (5' 1\")   Wt 51.3 kg (113 lb)   SpO2 97%   BMI 21.35 kg/m      "

## 2021-11-01 NOTE — DISCHARGE SUMMARY
"[place] Hospitalist Discharge Summary    Alix Keith MRN# [id number]   Age: 76 year old YOB: 1945     Date of Admission:  10/31/2021  Date of Discharge::  11/1/2021  Admitting Physician:  Dontrell Dillon MD  Discharge Physician:  Lia Oneill MD  Primary Physician: Clau Campos  Transferring Facility: N/A     Home clinic: unknown          Admission Diagnoses:   Acute upper GI bleeding [K92.2]  Upper GI bleed [K92.2]          Discharge Diagnosis:     Principle diagnosis: UGI bleed due to duodenal ulcer  Secondary diagnoses:  Patient Active Problem List   Diagnosis     Gastrointestinal hemorrhage associated with duodenal ulcer     Anemia due to blood loss, acute     Benign essential hypertension     Acquired hypothyroidism     Excessive drinking of alcohol without diagnosis of alcohol use disorder          Brief History of Presenting Illness:   As per admit hx  Alix Keith is a 76 year old woman visiting from [place] with a history of high blood pressure and hypothyroid, otherwise quite healthy, who on 10/31 experienced nausea w/hematemesis and melena.  There was no syncope, chest pain. She mentions a fall in Dec 2020 that she thought may have caused a prior bleeding ulcer and upon clarification, it sounds that she had a mechanical fall related to tripping on her own shoes in the garage, and landed on her knees and hands. There was no associated dizziness or fainting. The bleeding ulcer was diagnosed and \"ablated\" via endoscopy in early January, but they are unclear of details and do not believe they were told to follow up about it. Pt did not report taking pantoprazole at the time of this interview. Since that time, she has been having loss of interest in food including loss of taste and smell that she had tested and was found to be Covid negative. She has otherwise been in her usual state of health without noticing blood in the stool nor vomiting.  She remembers being given a " medication to help with the bleed and took it for a month, which appears to be pantoprazole per [place] prescription record.            Hospital Course:   Gastrointestinal hemorrhage associated with duodenal ulcer  Anemia due to blood loss, acute on probably chronic  Reportedly had a similar episode in [place] early Jan 2021. EGD done 10/31/2021 by GenSurg showing bleeding duodenal ulcers. Clipped and H. pylori biopsies pending. Likely acute on chronic because patient had been feeling low energy and reduced appetite ever since January, then with acute episode on day of admission. Appears to have been given pantoprazole and quit after 1 month  Was started on sucralfate and protonix. Doing well. Hg at 8.0 today. Tolerated regular diet. Would continue PPI daily. Needs to f/u PMD for h pylori results.    Abnormal UA  Slightly abnl UA. Pt is asymptomatic. Ur cx-p. Will not start antbx. F/u OP for final cx report.     Excessive drinking of alcohol without diagnosis of alcoholism  Average 14 drinks/week, corroborated by family member. No hx of withdrawal nor liver disease. Noted in case of relevance to bleeding. Normal transaminases and no stigmata of liver disease.  Stable.      Benign essential hypertension  Continue amlodipine, atenolol, lisinopril qhs     Acquired hypothyroidism  Continue  Synthroid qAM at least 30 minutes before any other meds or food     Covid-19 Status  Covid negative by PCR on presentation 10/31/2021          Procedures:   No procedures performed during this admission         Allergies:      Allergies   Allergen Reactions     Food Other (See Comments)     Doesn't eat beef, onion      Sulfa Drugs Other (See Comments)     Skin bubbled up and burned             Medications Prior to Admission:     Medications Prior to Admission   Medication Sig Dispense Refill Last Dose     amLODIPine (NORVASC) 10 MG tablet Take 10 mg by mouth every evening    10/29/2021 at pm     atenolol (TENORMIN) 25 MG tablet Take 25 mg  by mouth daily   10/30/2021 at am     atorvastatin (LIPITOR) 10 MG tablet Take 1 tablet by mouth every other day On odd days   10/29/2021 at am     Cyanocobalamin (VITAMIN B-12 PO) Take 1 tablet by mouth daily   10/30/2021 at am     levothyroxine (SYNTHROID/LEVOTHROID) 112 MCG tablet Take 112 mcg by mouth daily   10/30/2021 at am     lisinopril (ZESTRIL) 40 MG tablet Take 40 mg by mouth every evening    10/29/2021 at pm     Multiple Vitamins-Minerals (PRESERVISION AREDS 2) CAPS Take 1 capsule by mouth 2 times daily   10/29/2021 at am     Multiple Vitamins-Minerals (WOMENS MULTI GUMMIES) CHEW Take 2 chew tab by mouth daily   10/29/2021 at am             Discharge Medications:     Current Discharge Medication List      START taking these medications    Details   pantoprazole (PROTONIX) 40 MG EC tablet Take 1 tablet (40 mg) by mouth daily  Qty: 30 tablet, Refills: 0    Associated Diagnoses: Acute upper GI bleeding      sucralfate (CARAFATE) 1 GM tablet Take 1 tablet (1 g) by mouth 3 times daily (before meals)  Qty: 90 tablet, Refills: 0    Associated Diagnoses: Acute upper GI bleeding         CONTINUE these medications which have NOT CHANGED    Details   amLODIPine (NORVASC) 10 MG tablet Take 10 mg by mouth every evening       atenolol (TENORMIN) 25 MG tablet Take 25 mg by mouth daily      atorvastatin (LIPITOR) 10 MG tablet Take 1 tablet by mouth every other day On odd days      Cyanocobalamin (VITAMIN B-12 PO) Take 1 tablet by mouth daily      levothyroxine (SYNTHROID/LEVOTHROID) 112 MCG tablet Take 112 mcg by mouth daily      lisinopril (ZESTRIL) 40 MG tablet Take 40 mg by mouth every evening       Multiple Vitamins-Minerals (PRESERVISION AREDS 2) CAPS Take 1 capsule by mouth 2 times daily      Multiple Vitamins-Minerals (WOMENS MULTI GUMMIES) CHEW Take 2 chew tab by mouth daily                   Consultations:   Consultation during this admission received from surgery            Discharge Exam:   Blood pressure  "120/76, pulse 62, temperature 98.7  F (37.1  C), temperature source Oral, resp. rate 16, height 1.549 m (5' 1\"), weight 51.3 kg (113 lb), SpO2 97 %.  GENERAL APPEARANCE: healthy, alert and no distress  EYES: conjunctiva clear, eyes grossly normal  HENT: external ears and nose normal   NECK: supple, no masses or adenopathy  RESP: lungs clear to auscultation - no rales, rhonchi or wheezes  CV: regular rate and rhythm, normal S1 S2, no S3 or S4 and no murmur, click or rub   ABDOMEN: soft, nontender, no HSM or masses and bowel sounds normal  MS: no clubbing, cyanosis; no edema  SKIN: clear without significant rashes or lesions  NEURO: Normal strength and tone, sensory exam grossly normal, mentation intact and speech normal    Unresulted Labs Ordered in the Past 30 Days of this Admission     Date and Time Order Name Status Description    10/31/2021 12:04 PM Surgical Pathology Exam In process     10/31/2021 10:07 AM Urine Culture Preliminary           No results found for this or any previous visit (from the past 24 hour(s)).         Pending Tests at Discharge:   h pylori results  Final urine cx report         Discharge Instructions and Follow-Up:     Discharge diet: Regular   Discharge activity: Activity as tolerated   Discharge follow-up: Follow up with primary care provider in 1-2 weeks           Discharge Disposition:     Discharged to home      Attestation:  I have reviewed today's vital signs, notes, medications, labs and imaging.    Time Spent on this Encounter   ILia MD, personally saw the patient today and spent greater than 30 minutes discharging this patient.    Lia Oneill MD       "

## 2021-11-01 NOTE — PLAN OF CARE
[facility] DISCHARGE NOTE    Patient discharged to home at 2:12 PM via wheel chair. Accompanied by other:friend  and staff. Discharge instructions reviewed with patient and friend , opportunity offered to ask questions. Prescriptions filled and sent with patient upon discharge. All belongings sent with patient.    Bruna Galarza RN

## 2021-11-01 NOTE — PROGRESS NOTES
Subjective:     Seen and examined.  Denies further stools or hematemesis.  Denies pre-syncope.  Has appetite.  Denies chest pain or SOB.    I/O last 3 completed shifts:  In: 3228.33 [P.O.:1300; I.V.:1428.33; IV Piggyback:500]  Out: 550 [Urine:550]     No current outpatient medications on file.         ROUTINE IP LABS (Last four results)  BMP  Recent Labs   Lab 10/31/21  0906      POTASSIUM 4.6   CHLORIDE 104   DONNIE 9.0   CO2 23   BUN 48*   CR 1.25*   *     CBC  Recent Labs   Lab 11/01/21  0517 10/31/21  0906 10/31/21  0906   WBC  --   --  5.9  5.9   RBC  --   --  2.72*  2.77*   HGB 7.7*   < > 9.6*  9.7*   HCT  --   --  28.6*  28.7*   MCV  --   --  105*  104*   MCH  --   --  35.3*  35.0*   MCHC  --   --  33.6  33.8   RDW  --   --  12.2  12.1   PLT  --   --  291  264    < > = values in this interval not displayed.     INR  Recent Labs   Lab 10/31/21  0906   INR 0.97            Tmax: 98.1  --->  Tcurrent: 98.2   B/P: 112/64  P: 57  R: 18  SpO2:98%        EXAM  AOX4 NAD  CTAB  RRR  S&NTND +BS  No CCE        A/P:   1. Acute upper GI Bleed  - On protonix/Carafate--Continue Prilosec 40mg every day for 1 month, Carafate for 2 weeks, avoid NSAIDS  - No further melena or hematemesis  - Regular diet  - Saline lock  - Recheck Hgb at noon  - Can discharge after  - patient does have a known Schatzki ring, this was quite wide/patent and dilation did not seem prudent nor necessary at this time.  Discussed this at length with patient. Should she develop dysphagia would recommend repeat UGI with dilation  - H pylori pending and will be called to pateint  - she is planning return to her home in [place] soon.  Discussed that she should see her primary in 1-2 weeks    2. Acute blood loss anemia  - Recheck at noon    Beto Alcaraz DO on 11/1/2021 at 8:46 AM

## 2021-11-01 NOTE — PLAN OF CARE
End Of Shift Note    Situation: 77 yo female here with GI bleed s/p upper GI scope today with clipping of ulcer    Plan: Monitor overnight, clear liquid diet, Discharge tomorrow     Subjective/Objective: Denies pain, denies nausea, no emesis or stool on this shift. Pt comfortable in bed. Assist of 1 to restroom for cord management. Tolerating clear liquid diet.

## 2021-11-01 NOTE — PROGRESS NOTES
Uneventful night, pt states comfort. VSS, afebrile. Pt taking po clear liquids well and urinating without difficulties. No stools or any emesis tonight. Pt anxious for discharge today.

## 2021-11-02 LAB
PATH REPORT.COMMENTS IMP SPEC: NORMAL
PATH REPORT.COMMENTS IMP SPEC: NORMAL
PATH REPORT.FINAL DX SPEC: NORMAL
PATH REPORT.GROSS SPEC: NORMAL
PATH REPORT.MICROSCOPIC SPEC OTHER STN: NORMAL
PHOTO IMAGE: NORMAL

## 2021-11-02 PROCEDURE — 88305 TISSUE EXAM BY PATHOLOGIST: CPT | Mod: 26 | Performed by: PATHOLOGY

## 2021-11-03 LAB
BACTERIA UR CULT: ABNORMAL
BACTERIA UR CULT: ABNORMAL

## 2021-12-12 ENCOUNTER — HEALTH MAINTENANCE LETTER (OUTPATIENT)
Age: 76
End: 2021-12-12

## 2022-10-03 ENCOUNTER — HEALTH MAINTENANCE LETTER (OUTPATIENT)
Age: 77
End: 2022-10-03

## 2023-02-11 ENCOUNTER — HEALTH MAINTENANCE LETTER (OUTPATIENT)
Age: 78
End: 2023-02-11

## 2024-03-09 ENCOUNTER — HEALTH MAINTENANCE LETTER (OUTPATIENT)
Age: 79
End: 2024-03-09

## (undated) RX ORDER — EPINEPHRINE IN SOD CHLOR,ISO 1 MG/10 ML
SYRINGE (ML) INTRAVENOUS
Status: DISPENSED
Start: 2021-10-31